# Patient Record
Sex: FEMALE | Race: WHITE | NOT HISPANIC OR LATINO
[De-identification: names, ages, dates, MRNs, and addresses within clinical notes are randomized per-mention and may not be internally consistent; named-entity substitution may affect disease eponyms.]

---

## 2018-02-28 ENCOUNTER — TRANSCRIPTION ENCOUNTER (OUTPATIENT)
Age: 28
End: 2018-02-28

## 2019-04-17 ENCOUNTER — LABORATORY RESULT (OUTPATIENT)
Age: 29
End: 2019-04-17

## 2019-04-17 ENCOUNTER — TRANSCRIPTION ENCOUNTER (OUTPATIENT)
Age: 29
End: 2019-04-17

## 2019-04-17 PROBLEM — Z00.00 ENCOUNTER FOR PREVENTIVE HEALTH EXAMINATION: Status: ACTIVE | Noted: 2019-04-17

## 2019-05-09 ENCOUNTER — RX RENEWAL (OUTPATIENT)
Age: 29
End: 2019-05-09

## 2019-05-09 RX ORDER — LEVALBUTEROL TARTRATE 45 UG/1
45 AEROSOL, METERED ORAL
Qty: 1 | Refills: 5 | Status: ACTIVE | COMMUNITY
Start: 2019-05-09 | End: 1900-01-01

## 2020-07-27 ENCOUNTER — APPOINTMENT (OUTPATIENT)
Dept: ORTHOPEDIC SURGERY | Facility: CLINIC | Age: 30
End: 2020-07-27
Payer: COMMERCIAL

## 2020-07-27 VITALS
HEIGHT: 61 IN | WEIGHT: 148 LBS | BODY MASS INDEX: 27.94 KG/M2 | TEMPERATURE: 97.9 F | HEART RATE: 80 BPM | DIASTOLIC BLOOD PRESSURE: 75 MMHG | SYSTOLIC BLOOD PRESSURE: 118 MMHG

## 2020-07-27 PROCEDURE — 99204 OFFICE O/P NEW MOD 45 MIN: CPT

## 2020-07-27 PROCEDURE — 73610 X-RAY EXAM OF ANKLE: CPT | Mod: LT

## 2020-07-27 RX ORDER — CHOLECALCIFEROL (VITAMIN D3) 1250 MCG
1.25 MG CAPSULE ORAL
Qty: 6 | Refills: 1 | Status: ACTIVE | COMMUNITY
Start: 2020-07-27 | End: 1900-01-01

## 2020-07-27 RX ORDER — ASPIRIN 325 MG/1
325 TABLET, FILM COATED ORAL
Qty: 30 | Refills: 1 | Status: ACTIVE | COMMUNITY
Start: 2020-07-27 | End: 1900-01-01

## 2020-07-27 NOTE — ADDENDUM
[FreeTextEntry1] : I, Nilesh Harding, acted solely as a scribe for Dr. Steven Mendez on this date 07/27/2020 .\par All medical record entries made by the Scribe were at my, Dr. Steven Mendez, direction and personally dictated by me on 07/27/2020 . I have reviewed the chart and agree that the record accurately reflects my personal performance of the history, physical exam, assessment and plan. I have also personally directed, reviewed, and agreed with the chart.

## 2020-07-27 NOTE — PHYSICAL EXAM
[de-identified] : General: Alert and oriented x3. In no acute distress. Pleasant in nature with a normal affect. No apparent respiratory distress.\par \par L Ankle Exam\par Skin: Clean, dry, intact\par Inspection: No obvious malalignment, no swelling, no effusion; no lymphadenopathy\par Pulses: 2+ DP/PT pulses\par ROM: L Ankle Did not assess\par Tenderness: No tenderness over the lateral malleolus, no CFL/ATFL/PTFL pain. No medial malleolus pain, no deltoid ligament pain. No proximal fibular pain. No heel pain.\par Stability: Negative anterior/posterior drawer.\par Strength: 5/5 TA/GS/EHL\par Neuro: In tact to light touch throughout\par Additional tests: Negative Mortons test, Negative syndesmosis squeeze test.  [de-identified] : X-rays of the left ankle obtained from outside facility and reviewed by me today 07/27/2020. Revealed: Fibula fracture\par \par Stress view of the left ankle were ordered obtained and reviewed by me today, 07/27/2020 , revealed: Unstable fracture.

## 2020-07-27 NOTE — DISCUSSION/SUMMARY
[de-identified] : Today I had a lengthy discussion with the patient regarding their left ankle pain. I have addressed all the patient's concerns surrounding the pathology of their condition. I recommend that the patient utilize a CAM boot. The patient was fitted with a CAM boot in the office today. The patient was educated about the boot wear pattern and utilization, as well as the timeframe to come out of the boot. She was also given full instructions for using the boot. I advised the patient to utilize 325 mg of Aspirin as instructed for blood thinning purposes. The prescription for the Aspirin was provided for the patient in the office today. A discussion was had about an ORIF surgery. A lengthy discussion was had about the surgery. All risks, benefits and alternatives to the recommended surgical procedure were discussed which include but are not limited to bleeding, infection, nerve damage, vascular damage, failure of the wound to heal, the need for further surgery, loss of limb, DVT, PE, loss of life as well as the risks associated with general anesthesia. The patient verbalized understanding and provided informed consent to move forward with surgery. The patient understood and verbally agreed to the treatment plan. All of their questions were answered and they were satisfied with the visit. The patient should call the office if they have any questions or experience worsening symptoms. \par \par RBA of ankle fracture displacement on stress view - instability and post-traumatic arthritis. Patient elected to move forward with surgery.

## 2020-07-27 NOTE — HISTORY OF PRESENT ILLNESS
[FreeTextEntry1] : 29 year old female presenting with left ankle pain. The patient’s pain is noted to be a 3/10. The patient's pain began on 7/13/2020 when she hopped out of bed and she fell. The patient presents ambulating in crutches. The patient notes she has been non weight bearing. The patient describes their pain as intermittent. localized, and achy. The pain is made worse with bending and twisting the ankle. The patient notes she has fibromyalgia. She is currently taking ibuprofen. The patient states she is allergic to Tylenol.  No other complaints at this time.

## 2020-07-28 ENCOUNTER — OUTPATIENT (OUTPATIENT)
Dept: OUTPATIENT SERVICES | Facility: HOSPITAL | Age: 30
LOS: 1 days | End: 2020-07-28
Payer: COMMERCIAL

## 2020-07-28 DIAGNOSIS — Z11.59 ENCOUNTER FOR SCREENING FOR OTHER VIRAL DISEASES: ICD-10-CM

## 2020-07-28 PROCEDURE — U0003: CPT

## 2020-07-29 DIAGNOSIS — Z11.59 ENCOUNTER FOR SCREENING FOR OTHER VIRAL DISEASES: ICD-10-CM

## 2020-07-29 LAB — SARS-COV-2 RNA SPEC QL NAA+PROBE: SIGNIFICANT CHANGE UP

## 2020-07-31 ENCOUNTER — OUTPATIENT (OUTPATIENT)
Dept: INPATIENT UNIT | Facility: HOSPITAL | Age: 30
LOS: 1 days | Discharge: HOME | End: 2020-07-31
Payer: COMMERCIAL

## 2020-07-31 ENCOUNTER — RESULT REVIEW (OUTPATIENT)
Age: 30
End: 2020-07-31

## 2020-07-31 ENCOUNTER — APPOINTMENT (OUTPATIENT)
Dept: ORTHOPEDIC SURGERY | Facility: HOSPITAL | Age: 30
End: 2020-07-31

## 2020-07-31 VITALS
OXYGEN SATURATION: 100 % | WEIGHT: 134.92 LBS | DIASTOLIC BLOOD PRESSURE: 74 MMHG | SYSTOLIC BLOOD PRESSURE: 116 MMHG | HEART RATE: 93 BPM | HEIGHT: 61 IN | TEMPERATURE: 98 F | RESPIRATION RATE: 16 BRPM

## 2020-07-31 VITALS
RESPIRATION RATE: 16 BRPM | OXYGEN SATURATION: 100 % | TEMPERATURE: 99 F | SYSTOLIC BLOOD PRESSURE: 128 MMHG | DIASTOLIC BLOOD PRESSURE: 78 MMHG | HEART RATE: 101 BPM

## 2020-07-31 DIAGNOSIS — S82.892A OTHER FRACTURE OF LEFT LOWER LEG, INITIAL ENCOUNTER FOR CLOSED FRACTURE: ICD-10-CM

## 2020-07-31 LAB — HCG UR QL: NEGATIVE — SIGNIFICANT CHANGE UP

## 2020-07-31 PROCEDURE — 77071 MNL APPL STRS JT RADIOGRAPHY: CPT

## 2020-07-31 PROCEDURE — 27792 TREATMENT OF ANKLE FRACTURE: CPT | Mod: LT

## 2020-07-31 PROCEDURE — C1713: CPT

## 2020-07-31 PROCEDURE — 81025 URINE PREGNANCY TEST: CPT

## 2020-07-31 PROCEDURE — 76000 FLUOROSCOPY <1 HR PHYS/QHP: CPT

## 2020-07-31 RX ORDER — MUPIROCIN 20 MG/G
0 OINTMENT TOPICAL
Qty: 0 | Refills: 0 | DISCHARGE

## 2020-07-31 RX ORDER — ONDANSETRON 8 MG/1
4 TABLET, FILM COATED ORAL ONCE
Refills: 0 | Status: DISCONTINUED | OUTPATIENT
Start: 2020-07-31 | End: 2020-07-31

## 2020-07-31 RX ORDER — DOCUSATE SODIUM 100 MG
1 CAPSULE ORAL
Qty: 21 | Refills: 0
Start: 2020-07-31 | End: 2020-08-06

## 2020-07-31 RX ORDER — FENTANYL CITRATE 50 UG/ML
50 INJECTION INTRAVENOUS
Refills: 0 | Status: DISCONTINUED | OUTPATIENT
Start: 2020-07-31 | End: 2020-07-31

## 2020-07-31 RX ORDER — ASPIRIN/CALCIUM CARB/MAGNESIUM 324 MG
1 TABLET ORAL
Qty: 60 | Refills: 0
Start: 2020-07-31 | End: 2020-08-29

## 2020-07-31 RX ORDER — OXYCODONE HYDROCHLORIDE 5 MG/1
1 TABLET ORAL
Qty: 42 | Refills: 0
Start: 2020-07-31 | End: 2020-08-06

## 2020-07-31 RX ORDER — FAMOTIDINE 10 MG/ML
20 INJECTION INTRAVENOUS ONCE
Refills: 0 | Status: COMPLETED | OUTPATIENT
Start: 2020-07-31 | End: 2020-07-31

## 2020-07-31 RX ORDER — ONDANSETRON 8 MG/1
1 TABLET, FILM COATED ORAL
Qty: 18 | Refills: 0
Start: 2020-07-31 | End: 2020-08-02

## 2020-07-31 RX ORDER — LEVALBUTEROL 1.25 MG/.5ML
2 SOLUTION, CONCENTRATE RESPIRATORY (INHALATION)
Qty: 0 | Refills: 0 | DISCHARGE

## 2020-07-31 RX ORDER — OXYCODONE HYDROCHLORIDE 5 MG/1
5 TABLET ORAL ONCE
Refills: 0 | Status: DISCONTINUED | OUTPATIENT
Start: 2020-07-31 | End: 2020-07-31

## 2020-07-31 RX ORDER — MUPIROCIN 20 MG/G
1 OINTMENT TOPICAL
Refills: 0 | Status: DISCONTINUED | OUTPATIENT
Start: 2020-07-31 | End: 2020-07-31

## 2020-07-31 RX ADMIN — OXYCODONE HYDROCHLORIDE 5 MILLIGRAM(S): 5 TABLET ORAL at 12:31

## 2020-07-31 RX ADMIN — FAMOTIDINE 20 MILLIGRAM(S): 10 INJECTION INTRAVENOUS at 08:40

## 2020-07-31 NOTE — ASU DISCHARGE PLAN (ADULT/PEDIATRIC) - CARE PROVIDER_API CALL
Steven Mendez  ORTHOPAEDIC SURGERY  [address] [place] [address]  Phone: [phone]  Fax: [phone]  Follow Up Time:

## 2020-07-31 NOTE — ASU DISCHARGE PLAN (ADULT/PEDIATRIC) - ASU DC SPECIAL INSTRUCTIONSFT
Ankle Open reduction and internal fixation  DC Instructions:    1.	Analgesia  2.	Non-Weight Bearing  Left Lower Extremity, with assistive devices/crutchse as needed  3.	Continue DVT/PE Prophylaxis. EC ASpirin 325 x 4 weeks See Med Rec.  4.	PT daily  5.	Follow up with Orthopedic Surgeon Dr. Mendez in 10-14 Days after Discharge from the Hospital. Call Office For Appointment.  6.	Staples/Sutures to be removed Post-Op Day 14, and repeat x-rays in office.  7.	Elevate the extremity as much as possible  8.	Keep bandage/Splint Clean and dry. Do not get it wet. Do not walk or put any body weight on splint because it will break.

## 2020-07-31 NOTE — ASU DISCHARGE PLAN (ADULT/PEDIATRIC) - CALL YOUR DOCTOR IF YOU HAVE ANY OF THE FOLLOWING:
Swelling that gets worse/Fever greater than (need to indicate Fahrenheit or Celsius)/Wound/Surgical Site with redness, or foul smelling discharge or pus/Unable to urinate/Bleeding that does not stop/Pain not relieved by Medications

## 2020-08-05 DIAGNOSIS — J45.909 UNSPECIFIED ASTHMA, UNCOMPLICATED: ICD-10-CM

## 2020-08-05 DIAGNOSIS — S82.842A DISPLACED BIMALLEOLAR FRACTURE OF LEFT LOWER LEG, INITIAL ENCOUNTER FOR CLOSED FRACTURE: ICD-10-CM
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
## 2020-08-05 DIAGNOSIS — Z88.6 ALLERGY STATUS TO ANALGESIC AGENT: ICD-10-CM

## 2020-08-05 DIAGNOSIS — Z88.2 ALLERGY STATUS TO SULFONAMIDES: ICD-10-CM
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
## 2020-08-05 DIAGNOSIS — Z11.59 ENCOUNTER FOR SCREENING FOR OTHER VIRAL DISEASES: ICD-10-CM

## 2020-08-10 ENCOUNTER — APPOINTMENT (OUTPATIENT)
Dept: ORTHOPEDIC SURGERY | Facility: CLINIC | Age: 30
End: 2020-08-10
Payer: COMMERCIAL

## 2020-08-10 PROBLEM — M79.7 FIBROMYALGIA: Chronic | Status: ACTIVE | Noted: 2020-07-30

## 2020-08-10 PROBLEM — S82.892A OTHER FRACTURE OF LEFT LOWER LEG, INITIAL ENCOUNTER FOR CLOSED FRACTURE: Chronic | Status: ACTIVE | Noted: 2020-07-30

## 2020-08-10 PROCEDURE — 73610 X-RAY EXAM OF ANKLE: CPT | Mod: LT

## 2020-08-10 PROCEDURE — 99024 POSTOP FOLLOW-UP VISIT: CPT

## 2020-08-10 NOTE — HISTORY OF PRESENT ILLNESS
[___ Days Post Op] : post op day #[unfilled] [Clean/Dry/Intact] : clean, dry and intact [Healed] : healed [Neuro Intact] : an unremarkable neurological exam [Vascular Intact] : ~T peripheral vascular exam normal [Negative Dayana's] : maneuvers demonstrated a negative Dayana's sign [Excellent Pain Control] : has excellent pain control [Doing Well] : is doing well [No Sign of Infection] : is showing no signs of infection [Chills] : no chills [Fever] : no fever [Vomiting] : no vomiting [Discharge] : absent of discharge [Erythema] : not erythematous [Swelling] : not swollen [Sutures Removed] : sutures were not removed [Dehiscence] : not dehisced [de-identified] : s/p ORIF left distal fibula\par DOS 7/31/2020 [de-identified] : 29 year old female present in the office 10 days post op from ORIF left distal fibula. The patient's pain is noted to be a 3/10. The pain and swelling are noted to be 30% improved. The patient presents ambulating in crutches. The patient presents wearing a CAM boot. She is currently taking aspirin and oxycodone. No other complaints at this time.  [de-identified] : General: Alert and oriented x3. In no acute distress. Pleasant in nature with a normal affect. No apparent respiratory distress.\par The wound is intact. No evidence of any diastases or dehiscence. No surrounding erythema noted. No fluctuance. The area is warm and well perfused. The patient is able to wiggle their toes. Neurovascularly intact.  [de-identified] : Patient is a 29 year old female present in the office today 10 days s/p ORIF left distal fibula. I advised the patient to continue taking aspirin for blood thinning purposes. I advised the patient to utilize ice and elevate the ankle. I advised the patient to do gentle range of motion with the ankle. I would like to see the patient back in the office in 1 week to reassess their condition and remove sutures. I have addressed all the patient's concerns surrounding the pathology of their condition. The patient understood and verbally agreed to the treatment plan. All of their questions were answered and they were satisfied with the visit. The patient should call the office if they have any questions or experience worsening symptoms.  [de-identified] : 3V of the left ankle were ordered obtained and reviewed by me today, 08/10/2020 , revealed: Hardware in good position.

## 2020-08-10 NOTE — ADDENDUM
[FreeTextEntry1] : I, Nilesh Harding, acted solely as a scribe for Dr. Steven Mendez on this date 08/10/2020 .\par All medical record entries made by the Scribe were at my, Dr. Steven Mendez, direction and personally dictated by me on 08/10/2020 . I have reviewed the chart and agree that the record accurately reflects my personal performance of the history, physical exam, assessment and plan. I have also personally directed, reviewed, and agreed with the chart.

## 2020-08-17 ENCOUNTER — APPOINTMENT (OUTPATIENT)
Dept: ORTHOPEDIC SURGERY | Facility: CLINIC | Age: 30
End: 2020-08-17
Payer: COMMERCIAL

## 2020-08-17 PROCEDURE — 99024 POSTOP FOLLOW-UP VISIT: CPT

## 2020-08-17 NOTE — ADDENDUM
[FreeTextEntry1] : I, Nilesh Harding, acted solely as a scribe for Dr. Steven Mendez on this date 08/17/2020 .\par All medical record entries made by the Scribe were at my, Dr. Steven Mendez, direction and personally dictated by me on 08/17/2020 . I have reviewed the chart and agree that the record accurately reflects my personal performance of the history, physical exam, assessment and plan. I have also personally directed, reviewed, and agreed with the chart.

## 2020-08-17 NOTE — HISTORY OF PRESENT ILLNESS
[___ Days Post Op] : post op day #[unfilled] [Healed] : healed [Clean/Dry/Intact] : clean, dry and intact [Neuro Intact] : an unremarkable neurological exam [Vascular Intact] : ~T peripheral vascular exam normal [Negative Dayana's] : maneuvers demonstrated a negative Dayana's sign [Excellent Pain Control] : has excellent pain control [Doing Well] : is doing well [Sutures Removed] : sutures were removed [No Sign of Infection] : is showing no signs of infection [Chills] : no chills [Nausea] : no nausea [Fever] : no fever [Discharge] : absent of discharge [Vomiting] : no vomiting [Erythema] : not erythematous [Swelling] : not swollen [Dehiscence] : not dehisced [de-identified] : s/p ORIF left distal fibula\par DOS 7/31/2020. [de-identified] : 29 year old female present in the office 17 days post op from ORIF left distal fibula. The patient's pain is noted to be a 4/10. The pain is noted to be 50% improved, and the swelling is noted to be the same compared to the previous visit. The patient presents ambulating in crutches. The patient presents wearing a CAM boot. She reports having heat around the incision, and localized pain at the lateral side of the ankle and in the back of the heel. She is currently taking oxycodone. She is taking aspirin as well. The patient notes she is allergic to Tylenol. No other complaints at this time.  [de-identified] : Patient is a 29 year old female present in the office today 17 days s/p ORIF left distal fibula. The sutures were removed today. I recommend the patient undergo a course of physical therapy for the left ankle 2-3 times a week for a total of 6-8 weeks. A prescription was given for the physical therapy today. I advised the patient that she can begin weight bearing as tolerated next week. I recommend that the patient ween out of their CAM boot and into an ASO brace as tolerated. The patient was provided with the ASO brace in the office today. I recommend that the patient utilize meloxicam with food once per day as instructed. A prescription for the meloxicam was ordered for the patient in the office today. I recommend that the patient utilize ice and elevate the left ankle above the level of the heart. I would like to see the patient back in the office in 1 month to reassess their condition. I have addressed all the patient's concerns surrounding the pathology of their condition. The patient understood and verbally agreed to the treatment plan. All of their questions were answered and they were satisfied with the visit. The patient should call the office if they have any questions or experience worsening symptoms.  [de-identified] : None new obtained.  [de-identified] : General: Alert and oriented x3. In no acute distress. Pleasant in nature with a normal affect. No apparent respiratory distress.\par The wound is intact. No evidence of any diastases or dehiscence. No surrounding erythema noted. No fluctuance. The area is warm and well perfused. The patient is able to wiggle their toes. Neurovascularly intact.

## 2020-08-18 RX ORDER — OXYCODONE AND ACETAMINOPHEN 5; 325 MG/1; MG/1
5-325 TABLET ORAL
Qty: 20 | Refills: 0 | Status: ACTIVE | COMMUNITY
Start: 2020-08-18 | End: 1900-01-01

## 2020-08-19 RX ORDER — OXYCODONE 5 MG/1
5 TABLET ORAL
Qty: 30 | Refills: 0 | Status: ACTIVE | COMMUNITY
Start: 2020-08-19 | End: 1900-01-01

## 2020-09-17 ENCOUNTER — APPOINTMENT (OUTPATIENT)
Dept: ORTHOPEDIC SURGERY | Facility: CLINIC | Age: 30
End: 2020-09-17
Payer: COMMERCIAL

## 2020-09-17 PROCEDURE — 73610 X-RAY EXAM OF ANKLE: CPT | Mod: LT

## 2020-09-17 PROCEDURE — 99024 POSTOP FOLLOW-UP VISIT: CPT

## 2020-09-17 NOTE — HISTORY OF PRESENT ILLNESS
[___ Weeks Post Op] : [unfilled] weeks post op [Clean/Dry/Intact] : clean, dry and intact [Healed] : healed [Neuro Intact] : an unremarkable neurological exam [Vascular Intact] : ~T peripheral vascular exam normal [Negative Dayana's] : maneuvers demonstrated a negative Dayana's sign [Doing Well] : is doing well [Excellent Pain Control] : has excellent pain control [No Sign of Infection] : is showing no signs of infection [Chills] : no chills [Fever] : no fever [Nausea] : no nausea [Vomiting] : no vomiting [Erythema] : not erythematous [Discharge] : absent of discharge [Swelling] : not swollen [Dehiscence] : not dehisced [de-identified] : s/p ORIF left distal fibula\par DOS 7/31/2020.  [de-identified] : 29 year old female present in the office 7 weeks post op from ORIF left distal fibula. The patient's pain is noted to be a 2/10. The pain is noted to be 40% improved, and the swelling to be worse compared to the previous visit. She reports having a nerve/tingling sensation. The patient presents wearing a CAM boot. The patient has been attending physical therapy for this issue. She is currently taking oxycodone and Aspirin. No other complaints at this time.  [de-identified] : General: Alert and oriented x3. In no acute distress. Pleasant in nature with a normal affect. No apparent respiratory distress.\par The wound is intact. No evidence of any diastases or dehiscence. No surrounding erythema noted. No fluctuance. The area is warm and well perfused. The patient is able to wiggle their toes. Neurovascularly intact.  [de-identified] : 3V of the left ankle were ordered obtained and reviewed by me today, 09/17/2020 , revealed: Hardware in good position.  [de-identified] : Patient is a 29 year old female present in the office today 7 weeks s/p ORIF left distal fibula. I recommend that the patient ween out of their CAM boot and into her ASO brace as tolerated. I recommend that the patient utilize ice, NSAIDS PRN, and heat. She can also elevate the left ankle above the level of the heart. I recommend the patient undergo a course of physical therapy for the left ankle 2-3 times a week for a total of 6-8 weeks. A prescription was given for the physical therapy today. I would like to see the patient back in the office in 2-3 months to reassess their condition. I have addressed all the patient's concerns surrounding the pathology of their condition. The patient understood and verbally agreed to the treatment plan. All of their questions were answered and they were satisfied with the visit. The patient should call the office if they have any questions or experience worsening symptoms.

## 2020-09-17 NOTE — ADDENDUM
[FreeTextEntry1] : I, Nilesh Harding, acted solely as a scribe for Dr. Steven Mendez on this date 09/17/2020 .\par All medical record entries made by the Scribe were at my, Dr. Steven Mendez, direction and personally dictated by me on 09/17/2020 . I have reviewed the chart and agree that the record accurately reflects my personal performance of the history, physical exam, assessment and plan. I have also personally directed, reviewed, and agreed with the chart.

## 2020-11-11 ENCOUNTER — APPOINTMENT (OUTPATIENT)
Dept: ORTHOPEDIC SURGERY | Facility: CLINIC | Age: 30
End: 2020-11-11
Payer: COMMERCIAL

## 2020-11-11 PROCEDURE — 99072 ADDL SUPL MATRL&STAF TM PHE: CPT

## 2020-11-11 PROCEDURE — 73610 X-RAY EXAM OF ANKLE: CPT | Mod: LT

## 2020-11-11 PROCEDURE — 99213 OFFICE O/P EST LOW 20 MIN: CPT

## 2020-11-11 NOTE — DISCUSSION/SUMMARY
[de-identified] : Today I had a lengthy discussion with the patient regarding their left ankle pain. I have addressed all the patient's concerns surrounding the pathology of their condition. I recommend the patient undergo a course of physical therapy for the left ankle 2-3 times a week for a total of 6-8 weeks. A prescription was given for the physical therapy today. I advised the patient to utilize ice and heat on the left ankle. I would like to see the patient back in the office in 3 months to reassess their condition. The patient understood and verbally agreed to the treatment plan. All of their questions were answered and they were satisfied with the visit. The patient should call the office if they have any questions or experience worsening symptoms.

## 2020-11-11 NOTE — HISTORY OF PRESENT ILLNESS
[FreeTextEntry1] : 29 year old female presenting with left ankle pain. The patient’s pain is noted to be a 3-4/10. The patient is s/p ORIF left distal fibula DOS 7/31/2020. Beginning 2 weeks ago, she began to feel more pain when standing for long periods. She notes having some Achilles pain, lateral/medial ankle pain, and pain in the arch of her foot. The pain is noted to be 40% improved, and the swelling is noted to be the same compared to the previous visit. The patient has been attending physical therapy for this issue. She has been utilizing an ankle brace occasionally as well when she is going down stairs and when it is raining. She is currently taking no pain medication. The patient notes she is allergic to Tylenol. No other complaints at this time.

## 2020-11-11 NOTE — ADDENDUM
[FreeTextEntry1] : I, Nilesh Harding, acted solely as a scribe for Dr. Steven Mendez on this date 11/11/2020 .\par All medical record entries made by the Scribe were at my, Dr. Steven Mendez, direction and personally dictated by me on 11/11/2020 . I have reviewed the chart and agree that the record accurately reflects my personal performance of the history, physical exam, assessment and plan. I have also personally directed, reviewed, and agreed with the chart.

## 2020-11-11 NOTE — PHYSICAL EXAM
[de-identified] : General: Alert and oriented x3. In no acute distress. Pleasant in nature with a normal affect. No apparent respiratory distress.\par \par L Ankle Exam\par Skin: Clean, dry, intact\par Inspection: No obvious malalignment, no swelling, no effusion; no lymphadenopathy\par Pulses: 2+ DP/PT pulses\par ROM: L Ankle 10 degrees of dorsiflexion, 40 degrees of plantarflexion, 10 degrees of subtalar motion\par Tenderness: +posterior tibial tendon pain. +lateral ligament pain. No pain over the hardware. No tenderness over the lateral malleolus. No medial malleolus pain, no deltoid ligament pain. No proximal fibular pain. No heel pain.\par Stability: Negative anterior/posterior drawer.\par Strength: 5/5 TA/GS/EHL\par Neuro: In tact to light touch throughout\par Additional tests: Negative Mortons test, Negative syndesmosis squeeze test.  [de-identified] : 3V of the left ankle were ordered obtained and reviewed by me today, 11/11/2020 , revealed: Hardware in good position. Healed fracture.

## 2021-02-11 ENCOUNTER — APPOINTMENT (OUTPATIENT)
Dept: ORTHOPEDIC SURGERY | Facility: CLINIC | Age: 31
End: 2021-02-11
Payer: COMMERCIAL

## 2021-02-11 DIAGNOSIS — S82.892D OTHER FRACTURE OF LEFT LOWER LEG, SUBSEQUENT ENCOUNTER FOR CLOSED FRACTURE WITH ROUTINE HEALING: ICD-10-CM

## 2021-02-11 PROCEDURE — 99072 ADDL SUPL MATRL&STAF TM PHE: CPT

## 2021-02-11 PROCEDURE — 99213 OFFICE O/P EST LOW 20 MIN: CPT

## 2021-02-11 NOTE — HISTORY OF PRESENT ILLNESS
[FreeTextEntry1] : 30 year old female presenting with left ankle pain. The patient is s/p ORIF left distal fibula DOS 7/31/2020. The patient’s pain is noted to be a 3-4/10. The pain is noted to be 40% improved and the swelling is noted to be the same compared to the previous visit. She notes swelling and discomfort that is worst at night and with weather changes. The patient has been attending physical therapy for this issue. She is currently taking no pain medication. No other complaints at this time.

## 2021-02-11 NOTE — PHYSICAL EXAM
[de-identified] : General: Alert and oriented x3. In no acute distress. Pleasant in nature with a normal affect. No apparent respiratory distress.\par \par L Ankle Exam\par Skin: Clean, dry, intact\par Inspection: No obvious malalignment, no swelling, no effusion; no lymphadenopathy, incision well-healed\par Pulses: 2+ DP/PT pulses\par ROM: L Ankle 10 degrees of dorsiflexion, 40 degrees of plantarflexion, 10 degrees of subtalar motion\par Tenderness: +posterior tibial tendon pain. +lateral ligament pain. No pain over the hardware. No tenderness over the lateral malleolus. No medial malleolus pain, no deltoid ligament pain. No proximal fibular pain. No heel pain.\par Stability: Negative anterior/posterior drawer.\par Strength: 5/5 TA/GS/EHL\par Neuro: In tact to light touch throughout\par Additional tests: Negative Mortons test, Negative syndesmosis squeeze test.  [de-identified] : 3V of the left ankle were ordered, obtained and reviewed by me today, 02/11/2021, revealed: Hardware in good position. Healed fracture.

## 2021-02-11 NOTE — ADDENDUM
[FreeTextEntry1] : I, Luis Beltran, acted solely as a scribe for Dr. Steven Mendez on this date 02/11/2021. \par All medical record entries made by the Scribe were at my, Dr. Steven Mendez, direction and personally dictated by me on 02/11/2021 . I have reviewed the chart and agree that the record accurately reflects my personal performance of the history, physical exam, assessment and plan. I have also personally directed, reviewed, and agreed with the chart.

## 2021-02-11 NOTE — DISCUSSION/SUMMARY
[de-identified] : Today I had a lengthy discussion with the patient regarding her left ankle pain. I have addressed all the patient's concerns surrounding the pathology of her condition. I would like to see the patient back in the office in 5-6 months PRN to reassess her condition. The patient understood and verbally agreed to the treatment plan. All of her questions were answered and she was satisfied with the visit. The patient should call the office if she has any questions or experience worsening symptoms.

## 2021-04-03 ENCOUNTER — TRANSCRIPTION ENCOUNTER (OUTPATIENT)
Age: 31
End: 2021-04-03

## 2021-04-13 ENCOUNTER — TRANSCRIPTION ENCOUNTER (OUTPATIENT)
Age: 31
End: 2021-04-13

## 2021-10-04 DIAGNOSIS — Z83.79 FAMILY HISTORY OF OTHER DISEASES OF THE DIGESTIVE SYSTEM: ICD-10-CM

## 2021-10-04 DIAGNOSIS — Z83.3 FAMILY HISTORY OF DIABETES MELLITUS: ICD-10-CM

## 2021-10-04 DIAGNOSIS — Z87.39 PERSONAL HISTORY OF OTHER DISEASES OF THE MUSCULOSKELETAL SYSTEM AND CONNECTIVE TISSUE: ICD-10-CM

## 2021-10-04 DIAGNOSIS — E73.9 LACTOSE INTOLERANCE, UNSPECIFIED: ICD-10-CM

## 2021-10-04 DIAGNOSIS — Z86.2 PERSONAL HISTORY OF DISEASES OF THE BLOOD AND BLOOD-FORMING ORGANS AND CERTAIN DISORDERS INVOLVING THE IMMUNE MECHANISM: ICD-10-CM

## 2021-10-04 DIAGNOSIS — Z80.3 FAMILY HISTORY OF MALIGNANT NEOPLASM OF BREAST: ICD-10-CM

## 2021-10-04 DIAGNOSIS — Z87.81 PERSONAL HISTORY OF (HEALED) TRAUMATIC FRACTURE: ICD-10-CM

## 2021-10-04 DIAGNOSIS — Z87.09 PERSONAL HISTORY OF OTHER DISEASES OF THE RESPIRATORY SYSTEM: ICD-10-CM

## 2021-10-04 RX ORDER — OMEPRAZOLE 20 MG/1
20 CAPSULE, DELAYED RELEASE ORAL DAILY
Qty: 90 | Refills: 0 | Status: ACTIVE | COMMUNITY

## 2021-10-04 RX ORDER — LEVALBUTEROL HYDROCHLORIDE 1.25 MG/3ML
1.25 SOLUTION RESPIRATORY (INHALATION) 3 TIMES DAILY
Refills: 0 | Status: ACTIVE | COMMUNITY

## 2021-11-05 ENCOUNTER — APPOINTMENT (OUTPATIENT)
Dept: FAMILY MEDICINE | Facility: CLINIC | Age: 31
End: 2021-11-05

## 2022-01-05 ENCOUNTER — NON-APPOINTMENT (OUTPATIENT)
Age: 32
End: 2022-01-05

## 2022-11-18 ENCOUNTER — NON-APPOINTMENT (OUTPATIENT)
Age: 32
End: 2022-11-18

## 2024-03-10 ENCOUNTER — NON-APPOINTMENT (OUTPATIENT)
Age: 34
End: 2024-03-10

## 2024-03-10 ENCOUNTER — TRANSCRIPTION ENCOUNTER (OUTPATIENT)
Age: 34
End: 2024-03-10

## 2024-03-17 ENCOUNTER — NON-APPOINTMENT (OUTPATIENT)
Age: 34
End: 2024-03-17

## 2024-03-20 ENCOUNTER — NON-APPOINTMENT (OUTPATIENT)
Age: 34
End: 2024-03-20

## 2024-03-23 ENCOUNTER — NON-APPOINTMENT (OUTPATIENT)
Age: 34
End: 2024-03-23